# Patient Record
Sex: FEMALE | Race: BLACK OR AFRICAN AMERICAN | NOT HISPANIC OR LATINO | Employment: FULL TIME | URBAN - METROPOLITAN AREA
[De-identification: names, ages, dates, MRNs, and addresses within clinical notes are randomized per-mention and may not be internally consistent; named-entity substitution may affect disease eponyms.]

---

## 2021-03-17 ENCOUNTER — LAB VISIT (OUTPATIENT)
Dept: LAB | Facility: OTHER | Age: 41
End: 2021-03-17
Payer: MEDICAID

## 2021-03-17 DIAGNOSIS — Z20.822 ENCOUNTER FOR LABORATORY TESTING FOR COVID-19 VIRUS: ICD-10-CM

## 2021-03-17 PROCEDURE — U0003 INFECTIOUS AGENT DETECTION BY NUCLEIC ACID (DNA OR RNA); SEVERE ACUTE RESPIRATORY SYNDROME CORONAVIRUS 2 (SARS-COV-2) (CORONAVIRUS DISEASE [COVID-19]), AMPLIFIED PROBE TECHNIQUE, MAKING USE OF HIGH THROUGHPUT TECHNOLOGIES AS DESCRIBED BY CMS-2020-01-R: HCPCS | Performed by: NURSE PRACTITIONER

## 2021-03-18 LAB — SARS-COV-2 RNA RESP QL NAA+PROBE: NOT DETECTED

## 2023-02-17 ENCOUNTER — OFFICE VISIT (OUTPATIENT)
Dept: URGENT CARE | Facility: CLINIC | Age: 43
End: 2023-02-17
Payer: COMMERCIAL

## 2023-02-17 VITALS
HEIGHT: 59 IN | DIASTOLIC BLOOD PRESSURE: 81 MMHG | HEART RATE: 62 BPM | WEIGHT: 172 LBS | BODY MASS INDEX: 34.68 KG/M2 | OXYGEN SATURATION: 98 % | SYSTOLIC BLOOD PRESSURE: 135 MMHG | TEMPERATURE: 99 F

## 2023-02-17 DIAGNOSIS — M25.511 ACUTE PAIN OF RIGHT SHOULDER: ICD-10-CM

## 2023-02-17 DIAGNOSIS — Z02.6 ENCOUNTER RELATED TO WORKER'S COMPENSATION CLAIM: Primary | ICD-10-CM
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
## 2023-02-17 PROCEDURE — 99203 PR OFFICE/OUTPT VISIT, NEW, LEVL III, 30-44 MIN: ICD-10-PCS | Mod: S$GLB,,, | Performed by: NURSE PRACTITIONER

## 2023-02-17 PROCEDURE — 99203 OFFICE O/P NEW LOW 30 MIN: CPT | Mod: S$GLB,,, | Performed by: NURSE PRACTITIONER

## 2023-02-17 NOTE — LETTER
Urgent Care - [address] [address]  Phone: [phone]  Fax: [phone]  [facility]: [id number]    Pt Name: Toya Noel  Injury Date: 02/16/2023   Employee ID:  Date of First Treatment: 02/17/2023   Company: [employer]      Appointment Time: 08:35 AM Arrived: 0859   Provider: Jenna Herrera NP Time Out:1050     Office Treatment:   1. Encounter related to worker's compensation claim                     Return Appointment: Visit date not found at     n/a-No return appointment needed
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
## 2025-04-17 ENCOUNTER — OFFICE VISIT (OUTPATIENT)
Dept: URGENT CARE | Facility: CLINIC | Age: 45
End: 2025-04-17
Payer: COMMERCIAL

## 2025-04-17 VITALS
OXYGEN SATURATION: 99 % | RESPIRATION RATE: 20 BRPM | DIASTOLIC BLOOD PRESSURE: 84 MMHG | BODY MASS INDEX: 34.66 KG/M2 | SYSTOLIC BLOOD PRESSURE: 123 MMHG | WEIGHT: 171.94 LBS | TEMPERATURE: 98 F | HEIGHT: 59 IN | HEART RATE: 64 BPM

## 2025-04-17 DIAGNOSIS — S93.401A SPRAIN OF RIGHT ANKLE, UNSPECIFIED LIGAMENT, INITIAL ENCOUNTER: Primary | ICD-10-CM

## 2025-04-17 DIAGNOSIS — Z02.6 ENCOUNTER RELATED TO WORKER'S COMPENSATION CLAIM: ICD-10-CM

## 2025-04-17 LAB — BREATH ALCOHOL: 0

## 2025-04-17 RX ORDER — FAMOTIDINE 20 MG/1
TABLET, FILM COATED ORAL
COMMUNITY
Start: 2025-04-04

## 2025-04-17 RX ORDER — NYSTATIN AND TRIAMCINOLONE ACETONIDE 100000; 1 [USP'U]/G; MG/G
OINTMENT TOPICAL
COMMUNITY
Start: 2024-11-18

## 2025-04-17 RX ORDER — METFORMIN HYDROCHLORIDE 500 MG/1
TABLET ORAL
COMMUNITY
Start: 2024-09-23

## 2025-04-17 RX ORDER — METFORMIN HYDROCHLORIDE 500 MG/5ML
SOLUTION ORAL
COMMUNITY

## 2025-04-17 RX ORDER — MECLIZINE HYDROCHLORIDE 25 MG/1
TABLET ORAL
COMMUNITY
Start: 2024-10-09

## 2025-04-17 RX ORDER — IBUPROFEN 600 MG/1
600 TABLET ORAL EVERY 8 HOURS PRN
Qty: 30 TABLET | Refills: 0 | Status: SHIPPED | OUTPATIENT
Start: 2025-04-17

## 2025-04-17 RX ORDER — LOSARTAN POTASSIUM 50 MG/1
TABLET ORAL
COMMUNITY
Start: 2025-04-04

## 2025-04-17 RX ORDER — BENZONATATE 100 MG/1
100 CAPSULE ORAL 3 TIMES DAILY
COMMUNITY
Start: 2025-01-13

## 2025-04-17 RX ORDER — OMEPRAZOLE 40 MG/1
40 CAPSULE, DELAYED RELEASE ORAL
COMMUNITY
Start: 2025-04-04

## 2025-04-17 RX ORDER — SIMVASTATIN 40 MG/1
40 TABLET, FILM COATED ORAL NIGHTLY
COMMUNITY
Start: 2024-10-09

## 2025-04-17 RX ORDER — AMOXICILLIN 500 MG/1
500 CAPSULE ORAL 2 TIMES DAILY
COMMUNITY
Start: 2025-01-13

## 2025-04-17 RX ORDER — NIFEDIPINE 30 MG/1
TABLET, EXTENDED RELEASE ORAL
COMMUNITY
Start: 2024-09-06
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
## 2025-04-21 ENCOUNTER — OFFICE VISIT (OUTPATIENT)
Dept: URGENT CARE | Facility: CLINIC | Age: 45
End: 2025-04-21
Payer: COMMERCIAL

## 2025-04-21 VITALS
BODY MASS INDEX: 34.47 KG/M2 | SYSTOLIC BLOOD PRESSURE: 136 MMHG | RESPIRATION RATE: 17 BRPM | OXYGEN SATURATION: 100 % | TEMPERATURE: 98 F | DIASTOLIC BLOOD PRESSURE: 81 MMHG | HEART RATE: 55 BPM | HEIGHT: 59 IN | WEIGHT: 171 LBS

## 2025-04-21 DIAGNOSIS — Z02.6 ENCOUNTER RELATED TO WORKER'S COMPENSATION CLAIM: Primary | ICD-10-CM

## 2025-04-21 DIAGNOSIS — S93.401A SPRAIN OF RIGHT ANKLE, UNSPECIFIED LIGAMENT, INITIAL ENCOUNTER: ICD-10-CM

## 2025-04-21 NOTE — LETTER
[facility] [address] [address]  Phone: [phone]  Fax: [phone]  [facility]: [id number]    Pt Name: Toya Noel  Injury Date:     Employee ID:  Date of First Treatment: 04/21/2025   Company: [employer] EM[employer]OYEES      Appointment Time: 09:15 AM Arrived: 8:55 am    Provider: Jamshid Bella MD Time Out:9:30 am     Office Treatment:   1. Encounter related to worker's compensation claim    2. Sprain of right ankle, unspecified ligament, initial encounter          Patient Instructions: Attention not to aggravate affected area, Apply ice 24-48 hours then apply heat/warm soaks, Elevated affected area (ace wrap. ok to wear socks and tennis shoes/hi-tops instead of boots.)    Restrictions: Sit or stand as needed, Avoid climbing/kneeling/squatting, No Prolonged standing/walking     Return Appointment: 4/28/2025 at 10:00 am [place]
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
## 2025-04-28 ENCOUNTER — OFFICE VISIT (OUTPATIENT)
Dept: URGENT CARE | Facility: CLINIC | Age: 45
End: 2025-04-28
Payer: COMMERCIAL

## 2025-04-28 VITALS
DIASTOLIC BLOOD PRESSURE: 77 MMHG | HEIGHT: 59 IN | WEIGHT: 169.75 LBS | HEART RATE: 60 BPM | OXYGEN SATURATION: 99 % | SYSTOLIC BLOOD PRESSURE: 109 MMHG | BODY MASS INDEX: 34.22 KG/M2 | RESPIRATION RATE: 18 BRPM | TEMPERATURE: 98 F
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
## 2025-04-28 DIAGNOSIS — Z02.6 ENCOUNTER RELATED TO WORKER'S COMPENSATION CLAIM: ICD-10-CM

## 2025-04-28 DIAGNOSIS — S93.401A SPRAIN OF RIGHT ANKLE, UNSPECIFIED LIGAMENT, INITIAL ENCOUNTER: Primary | ICD-10-CM

## 2025-04-28 PROCEDURE — 99213 OFFICE O/P EST LOW 20 MIN: CPT | Mod: S$GLB,,, | Performed by: PHYSICIAN ASSISTANT

## 2025-04-28 NOTE — LETTER
[facility] [address] [address]  Phone: [phone]  Fax: [phone]  [facility]: [id number]    Pt Name: Toya Noel  Injury Date:     Employee ID: [id number] Date of First Treatment: 04/28/2025   Company: [employer]      Appointment Time: 09:45 AM Arrived: 9:04 AM   Provider: Compa Sharma PA-C Time Out: 9:24 AM     Office Treatment:   1. Sprain of right ankle, unspecified ligament, initial encounter    2. Encounter related to worker's compensation claim    3. Work related injury               Restrictions: Regular Duty, Discharged from Occupational Health (Allow to wear tennis shoes until ankle heals.)     Return Appointment: Return if symptoms fail to improve or worsen.    JUANA

## 2025-04-28 NOTE — PROGRESS NOTES
Subjective:      Patient ID: Toya Noel is a 45 y.o. female.    Chief Complaint: Ankle Pain (RT ANKLE)    Patient's place of employment - [employer]   Patient's job title -   Date of Injury - 4/17/2025  Body part injured - RT ANKLE   Current work status per last visit - HAS NOT BEEN BACK TO WORK NO LIGHT DUTY AVAILABLE   Improved, same, or worse - IMPROVED   Pain Scale right now (1-10) -  2/10    KSD    Provider note:   Patient presents for follow-up right ankle sprain.  She reports improvement since last office visit.  Says she has minimal pain with walking.  Says she has been taking ibuprofen and applying ice with improvement.  Patient requests to wear tennis shoes at work without restrictions. MEB    Ankle Pain   Pertinent negatives include no numbness.     Constitution: Positive for activity change.   Musculoskeletal:  Positive for joint pain. Negative for abnormal ROM of joint.   Skin:  Negative for wound and bruising.   Neurological:  Negative for numbness and tingling.     Objective:     Physical Exam  Vitals and nursing note reviewed.   Constitutional:       General: She is not in acute distress.     Appearance: She is well-developed. She is not diaphoretic.   HENT:      Head: Normocephalic and atraumatic.      Right Ear: Hearing and external ear normal.      Left Ear: Hearing and external ear normal.      Nose: Nose normal. No nasal deformity.   Eyes:      General: Lids are normal. No scleral icterus.     Conjunctiva/sclera: Conjunctivae normal.   Neck:      Trachea: Trachea normal.   Cardiovascular:      Pulses: Normal pulses.   Pulmonary:      Effort: Pulmonary effort is normal. No respiratory distress.      Breath sounds: No stridor.   Musculoskeletal:      Cervical back: Normal range of motion.      Right ankle: No swelling, deformity or ecchymosis. Tenderness present over the ATF ligament. No CF ligament, posterior TF ligament or proximal fibula tenderness. Normal range of motion. Anterior  drawer test negative. Normal pulse.      Right Achilles Tendon: Normal. No tenderness or defects.   Skin:     General: Skin is warm and dry.      Capillary Refill: Capillary refill takes less than 2 seconds.   Neurological:      Mental Status: She is alert. She is not disoriented.      GCS: GCS eye subscore is 4. GCS verbal subscore is 5. GCS motor subscore is 6.      Sensory: No sensory deficit.   Psychiatric:         Attention and Perception: She is attentive.         Speech: Speech normal.         Behavior: Behavior normal.          X-Ray Ankle Complete 3 View Right  Result Date: 4/17/2025  EXAMINATION: XR ANKLE COMPLETE 3 VIEW RIGHT CLINICAL HISTORY: Encounter for examination for insurance purposes TECHNIQUE: AP, lateral, and oblique images of the right ankle were performed. COMPARISON: None FINDINGS: No acute fracture or dislocation. Alignment is normal. The ankle mortise is congruent. The talar dome is intact. Joint spaces are preserved. No effusion.     No acute osseous abnormality of the ankle. Electronically signed by: aPrviz Cotto Date:    04/17/2025 Time:    17:17      Assessment:      1. Sprain of right ankle, unspecified ligament, initial encounter    2. Encounter related to worker's compensation claim    3. Work related injury      Plan:     Patient has reached MMI and will be discharged at this time.  She may return to clinic as needed.  Patient verbalized understanding and agreement.         Restrictions: Regular Duty, Discharged from Occupational Health (Allow to wear tennis shoes until ankle heals.)  Follow up if symptoms worsen or fail to improve.